# Patient Record
Sex: MALE | URBAN - NONMETROPOLITAN AREA
[De-identification: names, ages, dates, MRNs, and addresses within clinical notes are randomized per-mention and may not be internally consistent; named-entity substitution may affect disease eponyms.]

---

## 2020-09-30 ENCOUNTER — HISTORICAL (OUTPATIENT)
Dept: ADMINISTRATIVE | Facility: HOSPITAL | Age: 50
End: 2020-09-30

## 2025-01-30 ENCOUNTER — HOSPITAL ENCOUNTER (EMERGENCY)
Facility: HOSPITAL | Age: 55
Discharge: HOME | End: 2025-01-31
Attending: OTOLARYNGOLOGY
Payer: MEDICARE

## 2025-01-30 DIAGNOSIS — S60.221A CONTUSION OF RIGHT HAND, INITIAL ENCOUNTER: ICD-10-CM

## 2025-01-30 DIAGNOSIS — M79.641 HAND PAIN, RIGHT: ICD-10-CM

## 2025-01-30 DIAGNOSIS — R60.0 EDEMA OF HAND: ICD-10-CM

## 2025-01-30 DIAGNOSIS — M79.644 FINGER PAIN, RIGHT: Primary | ICD-10-CM

## 2025-01-30 PROCEDURE — 99283 EMERGENCY DEPT VISIT LOW MDM: CPT

## 2025-01-30 NOTE — Clinical Note
"Al "Al" Spriggings was seen and treated in our emergency department on 1/30/2025.  He may return to work on 02/01/2025.       If you have any questions or concerns, please don't hesitate to call.       RN    "

## 2025-01-31 VITALS
HEART RATE: 61 BPM | HEIGHT: 65 IN | WEIGHT: 130 LBS | TEMPERATURE: 98 F | BODY MASS INDEX: 21.66 KG/M2 | SYSTOLIC BLOOD PRESSURE: 99 MMHG | RESPIRATION RATE: 20 BRPM | OXYGEN SATURATION: 96 % | DIASTOLIC BLOOD PRESSURE: 75 MMHG

## 2025-01-31 PROBLEM — M79.644 FINGER PAIN, RIGHT: Status: ACTIVE | Noted: 2025-01-31

## 2025-01-31 PROBLEM — S60.221A CONTUSION OF RIGHT HAND: Status: ACTIVE | Noted: 2025-01-31

## 2025-01-31 PROBLEM — R60.0 EDEMA OF HAND: Status: ACTIVE | Noted: 2025-01-31

## 2025-01-31 PROBLEM — M79.641 HAND PAIN, RIGHT: Status: ACTIVE | Noted: 2025-01-31

## 2025-01-31 NOTE — DISCHARGE INSTRUCTIONS
ELEVATE RIGHT HAND TONIGHT ABOVE HEART AND ICE PRN. MOTRIN AND/OR TYLENOL PRN PAIN. FOLLOW UP PRIMARY MD FOR ANY PROBLEMS OR RETURN TO ER.